# Patient Record
Sex: FEMALE | Race: WHITE | Employment: UNEMPLOYED | URBAN - METROPOLITAN AREA
[De-identification: names, ages, dates, MRNs, and addresses within clinical notes are randomized per-mention and may not be internally consistent; named-entity substitution may affect disease eponyms.]

---

## 2018-01-09 NOTE — MISCELLANEOUS
Message   Recorded as Task   Date: 04/28/2016 08:10 AM, Created By: Maninder Jackson   Task Name: Go to Result   Assigned To: [facility]   Regarding Patient: Emma Packer, Status: Active   Comment:    Maninder Jackson - 28 Apr 2016 8:10 AM     TASK CREATED  please tell pt her emb is normal        Active Problems    1  Dysuria (788 1) (R30 0)   2  Fibroid (218 9) (D25 9)   3  Irregular menses (626 4) (N92 6)   4  Right lower quadrant pain (789 03) (R10 31)   5  Visit for screening mammogram (V76 12) (Z12 31)    Current Meds   1  KlonoPIN 0 5 MG Oral Tablet (ClonazePAM); Therapy: 83KAJ0898 to (Last Rx:19Awj3832)  Requested for: 24QAZ6995 Ordered    Allergies    1   Levaquin TABS    Signatures   Electronically signed by : Mj Santiago, ; Apr 28 2016  8:36AM EST                       (Author)

## 2018-01-11 NOTE — MISCELLANEOUS
Provider Comments  Provider Comments:   Patient did not show up for her sonohysterogram  To our knowledge did not call our office to cancel or reschedule  Signatures   Electronically signed by : Wilver Marshall, ; Apr 1 2016 10:30AM EST                       (Author)    Electronically signed by : Wilver Marshall, ; Apr 1 2016 10:30AM EST                       (Author)    Electronically signed by : Flora Tompkins DO;  Apr 1 2016 10:58AM EST                       (Author)

## 2018-01-18 NOTE — MISCELLANEOUS
Message   Recorded as Task   Date: 03/17/2016 11:11 AM, Created By: Jesse Angel   Task Name: Follow Up   Assigned To: Hollie Santillan   Regarding Patient: Jamie Damon, Status: Active   Comment:    Hollie Santillan - 17 Mar 2016 11:11 AM     TASK CREATED  pt is bleeduing heavier    pt feels that maybe her fibroid is worse    she had huge clots last night    do you want her to have a sono and endo bx?  vs an U/S? Cozette Section - 17 Mar 2016 12:42 PM     TASK REPLIED TO: Previously Assigned To Cozette Section  yes, schedule SIS and emb        Active Problems    1  Dysuria (788 1) (R30 0)   2  Fibroid (218 9) (D25 9)   3  Irregular menses (626 4) (N92 6)   4  Right lower quadrant pain (789 03) (R10 31)    Current Meds   1  KlonoPIN 0 5 MG Oral Tablet (ClonazePAM); Therapy: 70PPY2537 to (Last Rx:01Ppv6029)  Requested for: 12LCG8510 Ordered    Allergies    1   Levaquin TABS    Signatures   Electronically signed by : Griselda Shiley, ; Mar 17 2016  1:21PM EST                       (Author)

## 2018-01-18 NOTE — MISCELLANEOUS
Message   Recorded as Task   Date: 04/06/2016 01:27 PM, Created By: Randy Bates   Task Name: Miscellaneous   Assigned To: Rita Marie   Regarding Patient: Luis Salgado, Status: Active   CommentDuane Jesus - 06 Apr 2016 1:27 PM     TASK CREATED  Patient was no show for SIS/EMB on 4/1/16  Now wants appt next week  States she has MP now and it is heavy  States she never got a reminder call about her appointment, never got any reminder calls from us in 12 years  Reception says your schedule is full  What should we do? I also informed Utel of this to handle the reminder call problem  Daiana Roberts - 07 Apr 2016 10:10 AM     TASK REPLIED TO: Previously Assigned To Daiana Roberts  I am not here next week, see if we can fit her in the following week        Active Problems    1  Dysuria (788 1) (R30 0)   2  Fibroid (218 9) (D25 9)   3  Irregular menses (626 4) (N92 6)   4  Right lower quadrant pain (789 03) (R10 31)    Current Meds   1  KlonoPIN 0 5 MG Oral Tablet (ClonazePAM); Therapy: 42DAP0297 to (Last Rx:74Pve4144)  Requested for: 02BCY4780 Ordered    Allergies    1   Levaquin TABS    Signatures   Electronically signed by : Tolu Granado, ; Apr 7 2016 10:48AM EST                       (Author)

## 2018-03-09 ENCOUNTER — TELEPHONE (OUTPATIENT)
Dept: OBGYN CLINIC | Facility: CLINIC | Age: 48
End: 2018-03-09

## 2018-03-09 NOTE — TELEPHONE ENCOUNTER
Patient called to find out her blood type  Informed her AB negative  Mailed copy of blood work from 4/14/06

## 2020-01-23 ENCOUNTER — OFFICE VISIT (OUTPATIENT)
Dept: OBGYN CLINIC | Facility: CLINIC | Age: 50
End: 2020-01-23
Payer: COMMERCIAL

## 2020-01-23 VITALS — SYSTOLIC BLOOD PRESSURE: 106 MMHG | DIASTOLIC BLOOD PRESSURE: 76 MMHG | BODY MASS INDEX: 21.5 KG/M2 | WEIGHT: 131.2 LBS

## 2020-01-23 DIAGNOSIS — Z11.51 SCREENING FOR HPV (HUMAN PAPILLOMAVIRUS): ICD-10-CM

## 2020-01-23 DIAGNOSIS — N63.20 BREAST MASS, LEFT: ICD-10-CM

## 2020-01-23 DIAGNOSIS — Z01.419 ENCOUNTER FOR ANNUAL ROUTINE GYNECOLOGICAL EXAMINATION: ICD-10-CM

## 2020-01-23 DIAGNOSIS — Z12.4 CERVICAL CANCER SCREENING: Primary | ICD-10-CM

## 2020-01-23 PROCEDURE — 87624 HPV HI-RISK TYP POOLED RSLT: CPT | Performed by: OBSTETRICS & GYNECOLOGY

## 2020-01-23 PROCEDURE — G0145 SCR C/V CYTO,THINLAYER,RESCR: HCPCS | Performed by: OBSTETRICS & GYNECOLOGY

## 2020-01-23 PROCEDURE — S0610 ANNUAL GYNECOLOGICAL EXAMINA: HCPCS | Performed by: OBSTETRICS & GYNECOLOGY

## 2020-01-23 RX ORDER — CLONAZEPAM 0.5 MG/1
0.5 TABLET ORAL 2 TIMES DAILY
COMMUNITY

## 2020-01-23 RX ORDER — SUCRALFATE ORAL 1 G/10ML
SUSPENSION ORAL
COMMUNITY
End: 2020-01-23

## 2020-01-23 NOTE — PROGRESS NOTES
Assessment/Plan:    Pap and HPV done today    Left breast mass-this may be fibrocystic changes  Bilateral diagnostic mammogram ordered and left breast ultrasound ordered  Discussed self breast exams    colon cancer screening-colonoscopy was recommended    Fibroid-she has a history of a 4 cm posterior fibroid  Her exam is consistent with this  It is asymptomatic  She will keep track of any menstrual cycles that she has  discussed preventive care, regular exercise and a healthy diet      No problem-specific Assessment & Plan notes found for this encounter  Diagnoses and all orders for this visit:    Cervical cancer screening  -     Liquid-based pap, screening    Encounter for annual routine gynecological examination    Screening for HPV (human papillomavirus)  -     Liquid-based pap, screening    Breast mass, left  -     US breast left limited (diagnostic); Future  -     Mammo diagnostic bilateral w 3d & cad; Future    Other orders  -     Discontinue: sucralfate (CARAFATE) 1 g/10 mL suspension; Carafate 100 mg/mL oral suspension  -     clonazePAM (KlonoPIN) 0 5 mg tablet; Take 0 5 mg by mouth 2 (two) times a day          Subjective:      Patient ID: Alvarez Ring is a 52 y o  female  Patient here for yearly exam   Her last visit was in April 2016  She has been skipping menstrual cycles  Her last 1 was in December  She has been exercising intensely approximately 5 days a week and this seems to cause her to not have a cycle  She has minimal hot flashes  Normal Pap in 2014  She has not had a mammogram     She is currently being treated for 2 herniated discs  The following portions of the patient's history were reviewed and updated as appropriate: allergies, current medications, past family history, past medical history, past social history, past surgical history and problem list     Review of Systems   Constitutional: Negative  HENT: Negative  Eyes: Negative      Respiratory: Negative  Cardiovascular: Negative  Gastrointestinal: Negative  Endocrine: Negative  Genitourinary: Negative  Musculoskeletal: Negative  Skin: Negative  Allergic/Immunologic: Negative  Neurological: Negative  Hematological: Negative  Psychiatric/Behavioral: Negative  Objective:      /76 (BP Location: Left arm, Patient Position: Sitting, Cuff Size: Standard)   Wt 59 5 kg (131 lb 3 2 oz)   BMI 21 50 kg/m²          Physical Exam   Constitutional: She appears well-developed  Neck: No tracheal deviation present  No thyromegaly present  Cardiovascular: Normal rate and regular rhythm  Pulmonary/Chest: Effort normal and breath sounds normal  Right breast exhibits no inverted nipple, no mass, no nipple discharge, no skin change and no tenderness  Left breast exhibits mass  Left breast exhibits no inverted nipple, no nipple discharge, no skin change and no tenderness  Breasts are symmetrical    Examined seated and supine  Movable mass at approximately 2:00 a m  In the upper outer quadrant, nontender       Abdominal: Soft  She exhibits no distension and no mass  There is no tenderness  Genitourinary: Rectum normal, vagina normal and uterus normal  No labial fusion  There is no rash, tenderness, lesion or injury on the right labia  There is no rash, tenderness, lesion or injury on the left labia  Cervix exhibits no motion tenderness, no discharge and no friability  Right adnexum displays no mass, no tenderness and no fullness  Left adnexum displays no mass, no tenderness and no fullness  Genitourinary Comments: Posterior fibroid palpated   Vitals reviewed

## 2020-01-24 LAB
HPV HR 12 DNA CVX QL NAA+PROBE: NEGATIVE
HPV16 DNA CVX QL NAA+PROBE: NEGATIVE
HPV18 DNA CVX QL NAA+PROBE: NEGATIVE

## 2020-01-29 LAB
LAB AP GYN PRIMARY INTERPRETATION: NORMAL
Lab: NORMAL

## 2022-08-15 ENCOUNTER — TELEPHONE (OUTPATIENT)
Dept: OBGYN CLINIC | Facility: CLINIC | Age: 52
End: 2022-08-15

## 2022-09-14 ENCOUNTER — EMERGENCY VISIT (OUTPATIENT)
Dept: URBAN - METROPOLITAN AREA CLINIC 48 | Facility: CLINIC | Age: 52
End: 2022-09-14

## 2022-09-14 DIAGNOSIS — H53.19: ICD-10-CM

## 2022-09-14 PROCEDURE — 92004 COMPRE OPH EXAM NEW PT 1/>: CPT

## 2022-09-14 ASSESSMENT — VISUAL ACUITY
OS_CC: 20/20
OD_CC: 20/20
OU_CC: 20/20

## 2022-09-14 ASSESSMENT — TONOMETRY
OS_IOP_MMHG: 15
OD_IOP_MMHG: 15

## 2022-09-16 ENCOUNTER — ANNUAL EXAM (OUTPATIENT)
Dept: GYNECOLOGY | Facility: CLINIC | Age: 52
End: 2022-09-16
Payer: COMMERCIAL

## 2022-09-16 VITALS
BODY MASS INDEX: 24.91 KG/M2 | HEIGHT: 66 IN | DIASTOLIC BLOOD PRESSURE: 88 MMHG | WEIGHT: 155 LBS | SYSTOLIC BLOOD PRESSURE: 124 MMHG

## 2022-09-16 DIAGNOSIS — Z01.419 ENCOUNTER FOR ANNUAL ROUTINE GYNECOLOGICAL EXAMINATION: Primary | ICD-10-CM

## 2022-09-16 DIAGNOSIS — D21.9 FIBROID: ICD-10-CM

## 2022-09-16 DIAGNOSIS — Z12.4 CERVICAL CANCER SCREENING: ICD-10-CM

## 2022-09-16 DIAGNOSIS — Z12.31 ENCOUNTER FOR SCREENING MAMMOGRAM FOR BREAST CANCER: ICD-10-CM

## 2022-09-16 DIAGNOSIS — Z11.51 SCREENING FOR HPV (HUMAN PAPILLOMAVIRUS): ICD-10-CM

## 2022-09-16 PROCEDURE — G0145 SCR C/V CYTO,THINLAYER,RESCR: HCPCS | Performed by: OBSTETRICS & GYNECOLOGY

## 2022-09-16 PROCEDURE — S0612 ANNUAL GYNECOLOGICAL EXAMINA: HCPCS | Performed by: OBSTETRICS & GYNECOLOGY

## 2022-09-16 PROCEDURE — G0476 HPV COMBO ASSAY CA SCREEN: HCPCS | Performed by: OBSTETRICS & GYNECOLOGY

## 2022-09-16 NOTE — PROGRESS NOTES
Assessment/Plan:      Pap and HPV done today     breast exam is normal today  I recommended that she schedule a mammogram   She is very hesitant  Discussed self breast exams    colon cancer screening - urged her to schedule a colonoscopy   She has a GI doctor and I explained that she is overdue for screening  Maternal history of uterine cancer -she was not sure if this was endometrial or another type  We discussed the importance of yearly gyn exams and reporting any postmenopausal bleeding  If her mother's genetic testing is positive, she must let me know so we could discuss this an order genetic testing for patient if she desires  We discussed Kegel's and bladder training for her incontinence  She was given information to review  She would like to be conservative  4 cm fibroid- I ordered an ultrasound to check this  I did not palpate it on exam today  discussed preventive care, regular exercise and a healthy diet      No problem-specific Assessment & Plan notes found for this encounter  Diagnoses and all orders for this visit:    Encounter for annual routine gynecological examination  -     Mammo screening bilateral w 3d & cad; Future  -     Liquid-based pap, screening    Encounter for screening mammogram for breast cancer  -     US pelvis complete w transvaginal; Future    Cervical cancer screening  -     Liquid-based pap, screening    Screening for HPV (human papillomavirus)  -     Liquid-based pap, screening          Subjective:      Patient ID: Veto Bailey is a 46 y o  female  Patient here for yearly  She was last here in 2020  Her mother was recently diagnosed with uterine cancer  Apparently she had a large intrauterine mass and it is in her omentum  She is currently undergoing chemo and then they are going to evaluate her for surgery  It was diagnosed because she was having postmenopausal bleeding  She was otherwise healthy    She has genetic testing that is currently pending  The patient has had no menstrual cycles in about 2 years  She does have hot flashes during the day and at night  At times they are severe but overall she feels that they do not interfere with her daily activities  She does have a history of a 4 cm posterior fibroid, this has been relatively asymptomatic  She does have some incontinence, it seems to be both stress and also some urgency and frequency  She wears a liner every day  Normal Pap and negative HPV in 2020    At her last visit, a diagnostic mammogram and ultrasound was ordered for possible breast mass verses fibrocystic changes  These were not done  She does not want to have a mammogram     Her mother was dx with uterine cancer, she is getting chemo now  It is in her omentum, they will will determine if she needs surgery  Was having post menopausal bleeding  The following portions of the patient's history were reviewed and updated as appropriate: allergies, current medications, past family history, past medical history, past social history, past surgical history and problem list     Review of Systems   Constitutional: Negative  Gastrointestinal: Negative  Endocrine: Positive for heat intolerance  Genitourinary: Negative  Objective: There were no vitals taken for this visit  Physical Exam  Vitals reviewed  Constitutional:       Appearance: She is well-developed  Neck:      Thyroid: No thyromegaly  Trachea: No tracheal deviation  Cardiovascular:      Rate and Rhythm: Normal rate and regular rhythm  Pulmonary:      Effort: Pulmonary effort is normal       Breath sounds: Normal breath sounds  Chest:   Breasts: Breasts are symmetrical       Right: No inverted nipple, mass, nipple discharge, skin change or tenderness  Left: No inverted nipple, mass, nipple discharge, skin change or tenderness  Abdominal:      General: There is no distension        Palpations: Abdomen is soft  There is no mass  Tenderness: There is no abdominal tenderness  Genitourinary:     Labia:         Right: No rash, tenderness, lesion or injury  Left: No rash, tenderness, lesion or injury  Vagina: Normal       Cervix: No cervical motion tenderness, discharge or friability  Adnexa:         Right: No mass, tenderness or fullness  Left: No mass, tenderness or fullness          Rectum: Normal

## 2022-09-22 LAB
LAB AP GYN PRIMARY INTERPRETATION: NORMAL
Lab: NORMAL

## 2023-02-02 ENCOUNTER — ULTRASOUND (OUTPATIENT)
Dept: GYNECOLOGY | Facility: CLINIC | Age: 53
End: 2023-02-02

## 2023-02-02 ENCOUNTER — PROCEDURE VISIT (OUTPATIENT)
Dept: GYNECOLOGY | Facility: CLINIC | Age: 53
End: 2023-02-02

## 2023-02-02 DIAGNOSIS — R93.89 ENDOMETRIAL THICKENING ON ULTRASOUND: ICD-10-CM

## 2023-02-02 DIAGNOSIS — N93.9 ABNORMAL UTERINE BLEEDING (AUB): Primary | ICD-10-CM

## 2023-02-02 DIAGNOSIS — D21.9 FIBROID: Primary | ICD-10-CM

## 2023-02-02 NOTE — PROGRESS NOTES
AMB US Pelvic Non OB    Date/Time: 2/2/2023 7:06 AM  Performed by: Konstantin Mccoy  Authorized by: Hattie Bae DO   Universal Protocol:  Patient identity confirmed: verbally with patient      Procedure details:     Technique:  Transvaginal US, Non-OB    Position: lithotomy exam    Uterine findings:     Length (cm): 7 31    Height (cm):  4 85    Width (cm):  5 25    Endometrial stripe: identified      Endometrium thickness (mm):  4 5  Left ovary findings:     Left ovary:  Visualized    Length (cm): 2 73    Height (cm): 1 49    Width (cm): 1 69  Right ovary findings:     Right ovary:  Visualized    Length (cm): 2 35    Height (cm): 1 49    Width (cm): 1 69  Other findings:     Free pelvic fluid: not identified      Free peritoneal fluid: not identified    Post-Procedure Details:     Impression:  Retroverted uterus demonstrates a posterior right fibroid 5 3cm, larger from previous ultrasound (previously 4 1cm)  The endometrium is borderline thickened  The bilateral ovaries appear within normal limits  No free fluid  Tolerance: Tolerated well, no immediate complications    Complications: no complications    Additional Procedure Comments:      Compared to April 2016    [employer] F8 E8C-RS transvaginal transducer Serial # [id number] was used to perform the examination today and subsequently followed with high level disinfection utilizing Trophon EPR procedure  Ultrasound performed at:     [id number] [address]  Phone:  [phone]  Fax:  [phone]  Sonohysterogram    Date/Time: 2/2/2023 7:07 AM  Performed by: Hattie Bae DO  Authorized by:  Hattie Bae DO   Universal Protocol:  Patient identity confirmed: verbally with patient      Pre-procedure:     Prepped with: Hibiclens    Procedure:     Cervix cleaned and prepped: yes      Uterus sounded: yes      Catheter inserted: yes      Uterine cavity distended with saline: yes    Post-procedure:     Patient observed: yes Post procedure instructions given to patient: yes      Patient tolerated procedure well with no complications: yes    Comments:      Sonohysterogram demonstrates a smooth appearing endometrium  Endometrial biopsy    Date/Time: 2/2/2023 7:07 AM  Performed by: Carmen Fritz DO  Authorized by:  Carmen Fritz DO   Universal Protocol:  Patient identity confirmed: verbally with patient      Procedure:     Procedure: endometrial biopsy with Pipelle      A bivalve speculum was placed in the vagina: yes      Cervix cleaned and prepped: yes      Specimen collected: specimen collected and sent to pathology      Patient tolerated procedure well with no complications: yes

## 2023-02-02 NOTE — PROGRESS NOTES
Assessment/Plan:     Borderline endometrium-biopsy done especially given her mother's history  I explained that typically endometrial cancer is not hereditary  We will await results of the biopsy  Fibroid -this is larger however has not been checked in many years  It is asymptomatic  we will recheck this when she returns for her yearly in 7 months  There are no diagnoses linked to this encounter  Subjective:     Patient ID: Belén Bryant is a 46 y o  female  Patient here for sonohysterogram   At her yearly, she was concerned because her mother was diagnosed with uterine cancer  The patient has a history of a fibroid and I recommended repeat ultrasound  Ultrasound done at an outside facility showed that her fibroid measured 5 3 cm and her endometrium was mildly thickened at 7 mm  Ultrasound today shows the fibroid is stable from September  She has been menopausal for about 2 years  her endometrium is smooth, ovaries are normal   Her endometrium is 4 5 mm  We discussed biopsy      Review of Systems      Objective:     Physical Exam  Genitourinary:     General: Normal vulva        Vagina: Normal       Cervix: Normal       Uterus: Normal        Adnexa: Right adnexa normal and left adnexa normal

## 2023-07-19 ENCOUNTER — TELEPHONE (OUTPATIENT)
Dept: GYNECOLOGY | Facility: CLINIC | Age: 53
End: 2023-07-19

## 2023-07-19 NOTE — TELEPHONE ENCOUNTER
Patient called with a single episode of PMB. She did lift something heavy just prior to that. She was offered an appointment for tomorrow for [address] [place] [address], but she would like to wait to see if the bleeding returns. She will call back with concerns or questions. She will come in if needed. Patient is scheduled for follow up ultrasound and office visit in early October.

## 2023-07-20 DIAGNOSIS — N95.0 PMB (POSTMENOPAUSAL BLEEDING): Primary | ICD-10-CM

## 2023-10-04 NOTE — PROGRESS NOTES
Assessment/Plan:    pap is up to date    She has not had a mammogram is several years. this was ordered. I urged her to schedule this. Discussed self breast exams    colon cancer screening - she has never had any screening. She does not want a colonoscopy. She has no family history of polyps or colon cancer. Cologuard ordered    Fibroid - this has been stable, will observe with repeat US next year    Some leukocytes noted in urine dip. UA sent    Right sided pain - this has resolved. discussed preventive care, regular exercise and a healthy diet      No problem-specific Assessment & Plan notes found for this encounter. Diagnoses and all orders for this visit:    Pelvic pain  -     UA w Reflex to Microscopic w Reflex to Culture    Encounter for annual routine gynecological examination    Encounter for screening mammogram for breast cancer  -     Mammo screening bilateral w 3d & cad; Future    Colon cancer screening  -     Cologuard    Fibroid    Other orders  -     sucralfate (Carafate) 1 g/10 mL suspension;  (Patient not taking: Reported on 10/5/2023)  -     pantoprazole (PROTONIX) 40 mg tablet; Take 40 mg by mouth daily (Patient not taking: Reported on 10/5/2023)  -     lansoprazole (PREVACID) 30 mg capsule; Take 30 mg by mouth daily Take before a meal          Subjective:      Patient ID: Warner Merida is a 48 y.o. female. Pt here for yearly. Ultrasound today shows a thin endometrium, a posterior fibroid that measures 4.5 cm which is smaller than the last ultrasound and normal ovaries. She was having right sided pain but it seems to be better    In July, she had an episode of a tiny amount of bleeding. Her ultrasound showed an endometrium of 5 mm. She had just had a normal endometrial biopsy in February with atrophic endometrium so endometrial biopsy was not repeated. The endometrial biopsy was done because she had an ultrasound and her endometrium was borderline.   She was concerned because her mother was diagnosed with endometrial cancer. She had had no bleeding. Her fibroid has been getting smaller over the past few ultrasounds. Normal pap, negative HPV in 2022  She has not had a mammogram in several years. The following portions of the patient's history were reviewed and updated as appropriate: allergies, current medications, past family history, past medical history, past social history, past surgical history and problem list.    Review of Systems   Constitutional: Negative. Gastrointestinal: Negative. Genitourinary: Negative. Objective: There were no vitals taken for this visit. Physical Exam  Vitals reviewed. Constitutional:       Appearance: She is well-developed. Neck:      Thyroid: No thyromegaly. Trachea: No tracheal deviation. Cardiovascular:      Rate and Rhythm: Normal rate and regular rhythm. Pulmonary:      Effort: Pulmonary effort is normal.      Breath sounds: Normal breath sounds. Chest:   Breasts:     Breasts are symmetrical.      Right: No inverted nipple, mass, nipple discharge, skin change or tenderness. Left: No inverted nipple, mass, nipple discharge, skin change or tenderness. Abdominal:      General: There is no distension. Palpations: Abdomen is soft. There is no mass. Tenderness: There is no abdominal tenderness. Genitourinary:     Labia:         Right: No rash, tenderness, lesion or injury. Left: No rash, tenderness, lesion or injury. Vagina: Normal.      Cervix: No cervical motion tenderness, discharge or friability. Adnexa:         Right: No mass, tenderness or fullness. Left: No mass, tenderness or fullness.         Rectum: Normal.

## 2023-10-05 ENCOUNTER — ANNUAL EXAM (OUTPATIENT)
Dept: GYNECOLOGY | Facility: CLINIC | Age: 53
End: 2023-10-05
Payer: COMMERCIAL

## 2023-10-05 ENCOUNTER — ULTRASOUND (OUTPATIENT)
Dept: GYNECOLOGY | Facility: CLINIC | Age: 53
End: 2023-10-05
Payer: COMMERCIAL

## 2023-10-05 VITALS
HEIGHT: 66 IN | DIASTOLIC BLOOD PRESSURE: 80 MMHG | BODY MASS INDEX: 26.2 KG/M2 | SYSTOLIC BLOOD PRESSURE: 124 MMHG | WEIGHT: 163 LBS

## 2023-10-05 DIAGNOSIS — D21.9 FIBROID: ICD-10-CM

## 2023-10-05 DIAGNOSIS — Z01.419 ENCOUNTER FOR ANNUAL ROUTINE GYNECOLOGICAL EXAMINATION: ICD-10-CM

## 2023-10-05 DIAGNOSIS — R93.89 ENDOMETRIAL THICKENING ON ULTRASOUND: Primary | ICD-10-CM

## 2023-10-05 DIAGNOSIS — Z12.11 COLON CANCER SCREENING: ICD-10-CM

## 2023-10-05 DIAGNOSIS — R10.2 PELVIC PAIN: Primary | ICD-10-CM

## 2023-10-05 DIAGNOSIS — Z12.31 ENCOUNTER FOR SCREENING MAMMOGRAM FOR BREAST CANCER: ICD-10-CM

## 2023-10-05 PROCEDURE — 81003 URINALYSIS AUTO W/O SCOPE: CPT | Performed by: OBSTETRICS & GYNECOLOGY

## 2023-10-05 PROCEDURE — S0612 ANNUAL GYNECOLOGICAL EXAMINA: HCPCS | Performed by: OBSTETRICS & GYNECOLOGY

## 2023-10-05 PROCEDURE — 76830 TRANSVAGINAL US NON-OB: CPT | Performed by: OBSTETRICS & GYNECOLOGY

## 2023-10-05 RX ORDER — PANTOPRAZOLE SODIUM 40 MG/1
40 TABLET, DELAYED RELEASE ORAL DAILY
COMMUNITY
Start: 2023-07-31

## 2023-10-05 RX ORDER — LANSOPRAZOLE 30 MG/1
30 CAPSULE, DELAYED RELEASE ORAL DAILY
COMMUNITY
Start: 2023-09-29

## 2023-10-05 RX ORDER — SUCRALFATE ORAL 1 G/10ML
SUSPENSION ORAL
COMMUNITY

## 2023-10-05 NOTE — PROGRESS NOTES
AMB US Pelvic Non OB    Date/Time: 10/5/2023 2:20 PM    Performed by: Miya Quispe  Authorized by: Ayesha Laughlin DO  Universal Protocol:  Consent given by: patient  Patient identity confirmed: verbally with patient      Procedure details:     Indications: leiomyoma      Technique:  Transvaginal US, Non-OB    Position: lithotomy exam    Uterine findings:     Length (cm): 8.05    Height (cm):  5.9    Width (cm):  6.26    Endometrial stripe: identified      Endometrium thickness (mm):  4.4  Left ovary findings:     Left ovary:  Visualized    Length (cm): 2.3    Height (cm): 1.41    Width (cm): 1.53  Right ovary findings:     Right ovary:  Visualized    Length (cm): 2.35    Height (cm): 1.37    Width (cm): 1.63  Other findings:     Free pelvic fluid: not identified      Free peritoneal fluid: not identified    Post-Procedure Details:     Impression:  Retroverted uterus demonstrates a posterior right fibroid 4.5cm, decreasing in size the last two scans. The bilateral ovaries appear within normal limits. The right ovary is partially obscured by bowel gas. No free fluid. Tolerance: Tolerated well, no immediate complications    Complications: no complications    Additional Procedure Comments:      [employer] F8 E8C-RS transvaginal transducer Serial # [id number] was used to perform the examination today and subsequently followed with high level disinfection utilizing Trophon EPR procedure. Ultrasound performed at:     [address], [address] [place] [address]  Phone:  [phone]  Fax:  [phone]

## 2023-10-06 LAB
BILIRUB UR QL STRIP: NEGATIVE
CLARITY UR: CLEAR
COLOR UR: NORMAL
GLUCOSE UR STRIP-MCNC: NEGATIVE MG/DL
HGB UR QL STRIP.AUTO: NEGATIVE
KETONES UR STRIP-MCNC: NEGATIVE MG/DL
LEUKOCYTE ESTERASE UR QL STRIP: NEGATIVE
NITRITE UR QL STRIP: NEGATIVE
PH UR STRIP.AUTO: 6 [PH]
PROT UR STRIP-MCNC: NEGATIVE MG/DL
SP GR UR STRIP.AUTO: 1.02 (ref 1–1.03)
UROBILINOGEN UR STRIP-ACNC: <2 MG/DL

## 2024-05-08 ENCOUNTER — CONSULT EP (OUTPATIENT)
Dept: URBAN - METROPOLITAN AREA CLINIC 48 | Facility: CLINIC | Age: 54
End: 2024-05-08

## 2024-05-08 DIAGNOSIS — H04.123: ICD-10-CM

## 2024-05-08 DIAGNOSIS — H02.88A: ICD-10-CM

## 2024-05-08 DIAGNOSIS — H02.88B: ICD-10-CM

## 2024-05-08 DIAGNOSIS — H11.123: ICD-10-CM

## 2024-05-08 DIAGNOSIS — H11.153: ICD-10-CM

## 2024-05-08 PROCEDURE — 99214 OFFICE O/P EST MOD 30 MIN: CPT

## 2024-05-08 ASSESSMENT — VISUAL ACUITY
OD_CC: 20/20
OS_CC: 20/20

## 2024-05-08 ASSESSMENT — TONOMETRY
OS_IOP_MMHG: 17
OD_IOP_MMHG: 15

## 2025-01-02 ENCOUNTER — ANNUAL EXAM (OUTPATIENT)
Dept: GYNECOLOGY | Facility: CLINIC | Age: 55
End: 2025-01-02
Payer: COMMERCIAL

## 2025-01-02 ENCOUNTER — ULTRASOUND (OUTPATIENT)
Dept: GYNECOLOGY | Facility: CLINIC | Age: 55
End: 2025-01-02
Payer: COMMERCIAL

## 2025-01-02 VITALS — SYSTOLIC BLOOD PRESSURE: 110 MMHG | BODY MASS INDEX: 26.15 KG/M2 | DIASTOLIC BLOOD PRESSURE: 62 MMHG | WEIGHT: 162 LBS

## 2025-01-02 DIAGNOSIS — D21.9 FIBROID: ICD-10-CM

## 2025-01-02 DIAGNOSIS — D21.9 FIBROID: Primary | ICD-10-CM

## 2025-01-02 DIAGNOSIS — Z01.419 ENCOUNTER FOR ANNUAL ROUTINE GYNECOLOGICAL EXAMINATION: Primary | ICD-10-CM

## 2025-01-02 PROCEDURE — S0612 ANNUAL GYNECOLOGICAL EXAMINA: HCPCS | Performed by: OBSTETRICS & GYNECOLOGY

## 2025-01-02 PROCEDURE — 76830 TRANSVAGINAL US NON-OB: CPT | Performed by: OBSTETRICS & GYNECOLOGY

## 2025-01-02 NOTE — PROGRESS NOTES
Name: Zay Leong      : 1970      MRN: [id number]  Encounter Provider: Michelle Escamilla DO  Encounter Date: 2025   Encounter department: [facility]  :  Assessment & Plan  Encounter for annual routine gynecological examination         Fibroid         pap is up to date    We discussed mammogram.  She will not have one.  I offered to order one and she could schedule it at her convenience as she is going through some other medical issues at this time but she does not want it.  I encouraged her to give this thought as it is an important screening test.  Discussed self breast exams    colon cancer screening -she has not had any colon cancer screening.  I ordered a Cologuard but she has not had it done yet.  She does see GI for her stomach issues and had an EGD.  She is going to see her soon and I recommended that she discuss colon cancer screening.  She states that she did give a stool sample and it is possible this was tested but she should discuss to be sure.  I suggested that the pain she has with eating cheese could be from constipation although it is on the right side.  She could discuss with her GI doctor as well.    Fibroid-this is smaller.  It is asymptomatic.  I explained that this does not need to be looked that every year unless she is having pain or bleeding.  She is happy with this.    discussed preventive care, regular exercise and a healthy diet      History of Present Illness   HPI  Zay Leong is a 54 y.o. female who presents for yearly.  She has a posterior fibroid that we have been watching with ultrasound.  Last year, it measured 4.5 cm. Today it measures 3.7cm, endometrium is normal, ovaries are normal.  Fibroid is asymptomatic.  She has no GYN complaints.  She feels that if she eats too much cheese, she will have right lower quadrant pain that she associates with her ovary.  Her ovaries were normal on ultrasound.    Normal pap, negative HPV in     There  is no mammogram report on her chart.  She believes she had one several years ago.  She may have had 1 at a different facility.  She does not want to have one.  She does regular breast exams and has no family history of breast cancer.    She was recently diagnosed with small fiber neuropathy, numbness and tingling in her hands.  She is currently undergoing further evaluation for this.      Review of Systems   Constitutional: Negative.    Gastrointestinal: Negative.    Genitourinary: Negative.           Objective   There were no vitals taken for this visit.     Physical Exam  Vitals and nursing note reviewed. Exam conducted with a chaperone present.   Constitutional:       Appearance: She is well-developed.   HENT:      Head: Normocephalic and atraumatic.   Neck:      Thyroid: No thyromegaly.   Cardiovascular:      Rate and Rhythm: Normal rate and regular rhythm.   Pulmonary:      Effort: Pulmonary effort is normal.      Breath sounds: Normal breath sounds.   Chest:   Breasts:     Right: Normal.      Left: Normal.      Comments: Examined seated and supine  Abdominal:      Palpations: Abdomen is soft.   Genitourinary:     General: Normal vulva.      Vagina: Normal.      Cervix: Normal.      Uterus: Normal.       Adnexa: Right adnexa normal and left adnexa normal.      Rectum: Normal.      Comments: Retroverted uterus  Musculoskeletal:      Cervical back: Neck supple.   Skin:     General: Skin is warm and dry.   Neurological:      Mental Status: She is alert.   Psychiatric:         Mood and Affect: Mood normal.

## 2025-01-02 NOTE — PROGRESS NOTES
AMB US Pelvic Non OB    Date/Time: 1/2/2025 2:15 PM    Performed by: Belkys Almaraz  Authorized by: Michelle Escamilla DO  Universal Protocol:  Consent: Verbal consent obtained.  Consent given by: patient  Timeout called at: 1/2/2025 2:07 PM.  Patient understanding: patient states understanding of the procedure being performed  Patient identity confirmed: verbally with patient    Procedure details:     SIS Procedure: No    Indications: leiomyoma      Technique:  Transvaginal US, Non-OB    Position: lithotomy exam    Uterine findings:     Length (cm): 7.33    Height (cm):  5.16    Width (cm):  6.13    Endometrial stripe: identified      Endometrium thickness (mm):  2.7  Left ovary findings:     Left ovary:  Visualized    Length (cm): 2.37    Height (cm): 1.61    Width (cm): 1.47  Right ovary findings:     Right ovary:  Visualized    Length (cm): 2.29    Height (cm): 1.56    Width (cm): 1.64  Other findings:     Free pelvic fluid: not identified      Free peritoneal fluid: not identified    Post-Procedure Details:     Impression:  Retroverted uterus is inhomogeneous througout with a posterior right fibroid 3.7cm fibroid (previously 4.5cm). The bilateral ovaries appear within normal limits.  No free fluid.         Tolerance:  Tolerated well, no immediate complications    Complications: no complications    Additional Procedure Comments:      [email or web address] F8 E8C-RS transvaginal transducer Serial # [id number] was used to perform the examination today and subsequently followed with high level disinfection utilizing Trophon EPR procedure.       Ultrasound performed at:     [facility]  [address] [address]  Phone:  [phone]  Fax:  [phone]

## (undated) RX ORDER — PREDNISOLONE ACETATE 10 MG/ML
1 SUSPENSION/ DROPS OPHTHALMIC
Start: 2024-05-08

## (undated) RX ORDER — CYCLOSPORINE 0.5 MG/ML
1 EMULSION OPHTHALMIC TWICE A DAY
Start: 2024-05-08